# Patient Record
Sex: FEMALE | Race: WHITE | NOT HISPANIC OR LATINO
[De-identification: names, ages, dates, MRNs, and addresses within clinical notes are randomized per-mention and may not be internally consistent; named-entity substitution may affect disease eponyms.]

---

## 2018-12-09 PROBLEM — Z00.00 ENCOUNTER FOR PREVENTIVE HEALTH EXAMINATION: Status: ACTIVE | Noted: 2018-12-09

## 2018-12-21 ENCOUNTER — RECORD ABSTRACTING (OUTPATIENT)
Age: 23
End: 2018-12-21

## 2018-12-21 DIAGNOSIS — N83.209 UNSPECIFIED OVARIAN CYST, UNSPECIFIED SIDE: ICD-10-CM

## 2018-12-21 DIAGNOSIS — Z82.49 FAMILY HISTORY OF ISCHEMIC HEART DISEASE AND OTHER DISEASES OF THE CIRCULATORY SYSTEM: ICD-10-CM

## 2018-12-21 DIAGNOSIS — Z82.62 FAMILY HISTORY OF OSTEOPOROSIS: ICD-10-CM

## 2018-12-21 DIAGNOSIS — Z78.9 OTHER SPECIFIED HEALTH STATUS: ICD-10-CM

## 2018-12-21 DIAGNOSIS — Z92.89 PERSONAL HISTORY OF OTHER MEDICAL TREATMENT: ICD-10-CM

## 2019-01-07 ENCOUNTER — APPOINTMENT (OUTPATIENT)
Dept: OBGYN | Facility: CLINIC | Age: 24
End: 2019-01-07
Payer: COMMERCIAL

## 2019-01-07 ENCOUNTER — RESULT CHARGE (OUTPATIENT)
Age: 24
End: 2019-01-07

## 2019-01-07 VITALS
SYSTOLIC BLOOD PRESSURE: 98 MMHG | WEIGHT: 140 LBS | DIASTOLIC BLOOD PRESSURE: 60 MMHG | BODY MASS INDEX: 20.73 KG/M2 | HEIGHT: 69 IN

## 2019-01-07 LAB
BILIRUB UR QL STRIP: NORMAL
GLUCOSE UR-MCNC: NORMAL
HCG UR QL: 0.2 EU/DL
HCG UR QL: NEGATIVE
HGB UR QL STRIP.AUTO: NORMAL
KETONES UR-MCNC: NORMAL
LEUKOCYTE ESTERASE UR QL STRIP: NORMAL
NITRITE UR QL STRIP: NORMAL
PH UR STRIP: 5.5
PROT UR STRIP-MCNC: NORMAL
QUALITY CONTROL: YES
SP GR UR STRIP: 1.08

## 2019-01-07 PROCEDURE — 99000 SPECIMEN HANDLING OFFICE-LAB: CPT | Mod: NC

## 2019-01-07 PROCEDURE — 81003 URINALYSIS AUTO W/O SCOPE: CPT | Mod: QW

## 2019-01-07 PROCEDURE — 81025 URINE PREGNANCY TEST: CPT

## 2019-01-07 PROCEDURE — 99395 PREV VISIT EST AGE 18-39: CPT

## 2019-01-07 NOTE — PHYSICAL EXAM
[Awake] : awake [Alert] : alert [Oriented x3] : oriented to person, place, and time [Acute Distress] : no acute distress [Mass] : no breast mass [Tender] : no tenderness [Nipple Discharge] : no nipple discharge [Axillary LAD] : no axillary lymphadenopathy

## 2019-06-25 ENCOUNTER — RX RENEWAL (OUTPATIENT)
Age: 24
End: 2019-06-25

## 2019-12-02 ENCOUNTER — RX RENEWAL (OUTPATIENT)
Age: 24
End: 2019-12-02

## 2020-03-04 ENCOUNTER — RX RENEWAL (OUTPATIENT)
Age: 25
End: 2020-03-04

## 2020-05-26 ENCOUNTER — RX RENEWAL (OUTPATIENT)
Age: 25
End: 2020-05-26

## 2020-06-08 ENCOUNTER — APPOINTMENT (OUTPATIENT)
Dept: OBGYN | Facility: CLINIC | Age: 25
End: 2020-06-08
Payer: COMMERCIAL

## 2020-06-08 VITALS
SYSTOLIC BLOOD PRESSURE: 114 MMHG | HEIGHT: 69 IN | DIASTOLIC BLOOD PRESSURE: 62 MMHG | WEIGHT: 145 LBS | BODY MASS INDEX: 21.48 KG/M2

## 2020-06-08 DIAGNOSIS — Z80.41 FAMILY HISTORY OF MALIGNANT NEOPLASM OF OVARY: ICD-10-CM

## 2020-06-08 DIAGNOSIS — Z83.3 FAMILY HISTORY OF DIABETES MELLITUS: ICD-10-CM

## 2020-06-08 DIAGNOSIS — R55 SYNCOPE AND COLLAPSE: ICD-10-CM

## 2020-06-08 DIAGNOSIS — Z80.7 FAMILY HISTORY OF OTHER MALIGNANT NEOPLASMS OF LYMPHOID, HEMATOPOIETIC AND RELATED TISSUES: ICD-10-CM

## 2020-06-08 PROCEDURE — 99395 PREV VISIT EST AGE 18-39: CPT

## 2020-06-08 PROCEDURE — 99214 OFFICE O/P EST MOD 30 MIN: CPT | Mod: 25

## 2020-06-08 RX ORDER — DESOGESTREL AND ETHINYL ESTRADIOL AND ETHINYL ESTRADIOL 21-5 (28)
0.15-0.02/0.01 KIT ORAL
Qty: 3 | Refills: 0 | Status: DISCONTINUED | COMMUNITY
Start: 2020-03-04 | End: 2020-06-08

## 2020-06-10 PROBLEM — R55 VASO VAGAL EPISODE: Status: ACTIVE | Noted: 2020-06-10

## 2020-06-10 LAB
CYTOLOGY CVX/VAG DOC THIN PREP: NORMAL
HPV HIGH+LOW RISK DNA PNL CVX: NOT DETECTED

## 2020-06-10 NOTE — HISTORY OF PRESENT ILLNESS
[Good] : being in good health [Healthy Diet] : a healthy diet [Regular Exercise] : regular exercise [Reproductive Age] : is of reproductive age [Definite:  ___ (Date)] : the last menstrual period was [unfilled] [Menarche Age: ____] : age at menarche was [unfilled] [Oral Contraceptive] : uses oral contraception pills [Contraception] : uses contraception [Oral Contraceptives] : uses oral contraceptives [Weight Concerns] : no concerns with her weight [Menstrual Problems] : reports normal menses [Pregnancy History] : denies prior pregnancies [de-identified] : PELVIC U/S 3/23/2013 [Sexually Active] : is not sexually active

## 2020-06-10 NOTE — COUNSELING
[Nutrition] : nutrition [Breast Self Exam] : breast self exam [Exercise] : exercise [Vitamins/Supplements] : vitamins/supplements [Safe Sexual Practices] : safe sexual practices [Contraception] : contraception

## 2021-07-12 ENCOUNTER — RX RENEWAL (OUTPATIENT)
Age: 26
End: 2021-07-12

## 2021-07-12 RX ORDER — DESOGESTREL/ETHINYL ESTRADIOL AND ETHINYL ESTRADIOL 21-5 (28)
0.15-0.02/0.01 KIT ORAL DAILY
Qty: 3 | Refills: 0 | Status: COMPLETED | COMMUNITY
Start: 2019-01-07 | End: 2021-07-12

## 2021-08-28 ENCOUNTER — NON-APPOINTMENT (OUTPATIENT)
Age: 26
End: 2021-08-28

## 2021-08-28 ENCOUNTER — APPOINTMENT (OUTPATIENT)
Dept: OBGYN | Facility: CLINIC | Age: 26
End: 2021-08-28
Payer: COMMERCIAL

## 2021-08-28 VITALS
DIASTOLIC BLOOD PRESSURE: 78 MMHG | SYSTOLIC BLOOD PRESSURE: 128 MMHG | BODY MASS INDEX: 22.36 KG/M2 | HEIGHT: 69 IN | WEIGHT: 151 LBS

## 2021-08-28 PROCEDURE — 99395 PREV VISIT EST AGE 18-39: CPT

## 2021-08-28 RX ORDER — ESCITALOPRAM OXALATE 5 MG/1
5 TABLET, FILM COATED ORAL
Refills: 0 | Status: ACTIVE | COMMUNITY

## 2021-08-28 RX ORDER — ESCITALOPRAM OXALATE 10 MG/1
10 TABLET, FILM COATED ORAL
Refills: 0 | Status: DISCONTINUED | COMMUNITY
End: 2021-08-28

## 2021-08-28 RX ORDER — DESOGESTREL/ETHINYL ESTRADIOL AND ETHINYL ESTRADIOL 21-5 (28)
0.15-0.02/0.01 KIT ORAL
Refills: 0 | Status: DISCONTINUED | COMMUNITY
End: 2021-08-28

## 2021-08-28 NOTE — HISTORY OF PRESENT ILLNESS
[Oral Contraceptive] : uses oral contraception pills [Withdrawal] : uses withdrawal [N] : Patient denies prior pregnancies [Y] : Patient is sexually active [Menarche Age: ____] : age at menarche was [unfilled] [No] : Patient does not have concerns regarding sex [Currently Active] : currently active [Men] : men [Yes] : Yes [Condoms] : Condoms [TextBox_4] : 26-year-old G0 LMP 8/19/2021 presents for annual gynecological exam\par \par Patient is currently weaning off of Lexapro and tolerating it well and denies any ideation\par \par Patient is tolerating oral contraceptives well requesting to continue.  She has occasional vaginal dryness and will initiate coconut oil as a lubricant and call for any increasing or persisting symptoms\par \par Patient had a vasovagal reaction to her last GYN exam feels well going into today's exam we will watch her closely\par \par Patient is sexually active and using condoms and oral contraceptive\par \par Coco a  in [place] [PapSmeardate] : 6/8/20 [TextBox_31] : neg [HPVDate] : 6/8/20 [TextBox_78] : neg [LMPDate] : 8/19/21 [PGHxTotal] : 0 [FreeTextEntry1] : 8/19/21

## 2021-08-28 NOTE — COUNSELING
[Nutrition/ Exercise/ Weight Management] : nutrition, exercise, weight management [Vitamins/Supplements] : vitamins/supplements [Breast Self Exam] : breast self exam [Sunscreen] : sunscreen [Contraception/ Emergency Contraception/ Safe Sexual Practices] : contraception, emergency contraception, safe sexual practices [STD (testing, results, tx)] : STD (testing, results, tx)

## 2021-08-28 NOTE — PLAN
[FreeTextEntry1] : Instructions and precautions for oral contraceptive use reviewed with the patient\par \par Follow-up in 1 year

## 2021-08-30 LAB
C TRACH RRNA SPEC QL NAA+PROBE: NOT DETECTED
N GONORRHOEA RRNA SPEC QL NAA+PROBE: NOT DETECTED
SOURCE TP AMPLIFICATION: NORMAL

## 2021-09-11 ENCOUNTER — TRANSCRIPTION ENCOUNTER (OUTPATIENT)
Age: 26
End: 2021-09-11

## 2021-09-18 LAB — CYTOLOGY CVX/VAG DOC THIN PREP: ABNORMAL

## 2022-04-15 ENCOUNTER — APPOINTMENT (OUTPATIENT)
Dept: OBGYN | Facility: CLINIC | Age: 27
End: 2022-04-15

## 2022-04-30 ENCOUNTER — APPOINTMENT (OUTPATIENT)
Dept: OBGYN | Facility: CLINIC | Age: 27
End: 2022-04-30

## 2022-05-02 ENCOUNTER — APPOINTMENT (OUTPATIENT)
Dept: OBGYN | Facility: CLINIC | Age: 27
End: 2022-05-02
Payer: COMMERCIAL

## 2022-05-02 PROCEDURE — 99214 OFFICE O/P EST MOD 30 MIN: CPT | Mod: 95

## 2022-05-03 NOTE — HISTORY OF PRESENT ILLNESS
[FreeTextEntry1] : Patient's name and date of birth were confirmed at the beginning of the visit.\par Patient presents for this visit to discuss her pain with intercourse, vaginal dryness and hot flushes..  Patient made aware of the possibility for connection related issues and still wished to proceed with telehealth visit.\par \par \par Patient made aware  of privacy practices patient made aware that telehealth visits may incur similar charges to an in person visit.  Patient made aware that she may still need an in person visit at the end of the telehealth appointment\par \par Patient presents stating that she has vaginal dryness that she feels may be contributing to her pain with intercourse.  Patient states she stopped her Lexapro which she was taking for anxiety to see if the vaginal dryness improved but her symptoms of pain with intercourse persist.  Patient continues to take oral contraceptives.\par \par Patient stating she feels as if she gets a rug burn type sensation in the vagina every time she is sexually active.  She is also complaining of hot flashes the week of her menses.\par \par Patient lives in [place] currently.\par \par I did review the benefits of a speculum exam with any vaginal complaints to rule out any chance for a infection and patient verbalized good understanding.  \par \par Patient is considering stopping oral contraceptive and safe sex practices alternative options/including barrier methods were reviewed.\par \par I also reviewed the causes of hot flashes and vaginal dryness and patient is interested in having her hormone levels checked we discussed her\par \par Pain with intercourse symptoms may be related to a pelvic floor dysfunction and again I advised that she should have a pelvic exam and evaluation.  Patient has questions about pelvic floor dysfunction and we reviewed the benefits of physical therapy as well as the options of medications if found on exam.\par \par Patient stating she wishes to come in for a pelvic exam and hormone work-up in 1 month.  Safe sex practices reviewed.  Warning signs for more immediate attention outlined\par \par Patient states she is feeling well off the Lexapro and denies any increased anxiety symptoms.  I advise she follow-up closely with her primary care physician as well as consider psychotherapy as the benefits were reviewed.  Patient verbalizes good understanding\par \par \par \par

## 2022-07-02 DIAGNOSIS — N89.8 OTHER SPECIFIED NONINFLAMMATORY DISORDERS OF VAGINA: ICD-10-CM

## 2022-07-23 ENCOUNTER — TRANSCRIPTION ENCOUNTER (OUTPATIENT)
Age: 27
End: 2022-07-23

## 2022-08-01 ENCOUNTER — APPOINTMENT (OUTPATIENT)
Dept: OBGYN | Facility: CLINIC | Age: 27
End: 2022-08-01

## 2022-08-01 DIAGNOSIS — Z30.09 ENCOUNTER FOR OTHER GENERAL COUNSELING AND ADVICE ON CONTRACEPTION: ICD-10-CM

## 2022-08-01 DIAGNOSIS — N89.8 OTHER SPECIFIED NONINFLAMMATORY DISORDERS OF VAGINA: ICD-10-CM

## 2022-08-01 DIAGNOSIS — R23.2 FLUSHING: ICD-10-CM

## 2022-08-01 DIAGNOSIS — N94.10 UNSPECIFIED DYSPAREUNIA: ICD-10-CM

## 2022-08-01 PROCEDURE — 99213 OFFICE O/P EST LOW 20 MIN: CPT | Mod: 95

## 2022-08-01 NOTE — HISTORY OF PRESENT ILLNESS
[FreeTextEntry1] : Patient presents for telehealth visit and she was counseled that she may still require an in person office visit.  She was advised that the charges are similar to an in person office visit.  She was advised of the possible connection issues that may require her to still have an in person visit.  Her date of birth and last name was confirmed.  Privacy practices for telehealth visits was reviewed with the patient and [employer] for telehealth visits was reviewed and the patient wished to proceed\par \par \par Patient presents for telehealth consultation to follow-up on her complaints of vaginal dryness and she came off of Lexapro and birth control and her vaginal dryness has completely gone away.  She is working closely with a therapist and they are considering whether or not they need to start medication.\par \par She is interested in restarting the birth control but a different 1 and she will monitor her moods closely.\par \par We reviewed her normal lab work within normal [facility].  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  I advised we do a recheck at the time of her annual in 3 months and patient verbalizes good understanding

## 2022-08-01 NOTE — PLAN
[FreeTextEntry1] : Follow-up in 3 months for annual exam and recheck on new birth control or sooner as needed.\par \par The importance of daily exercise calcium vitamin D was reviewed.\par \par Continued follow-up and surveillance with therapist and psychiatric support encouraged \par \par patient appreciative verbalizes good understanding

## 2022-11-12 ENCOUNTER — NON-APPOINTMENT (OUTPATIENT)
Age: 27
End: 2022-11-12

## 2022-11-12 ENCOUNTER — APPOINTMENT (OUTPATIENT)
Dept: OBGYN | Facility: CLINIC | Age: 27
End: 2022-11-12

## 2022-11-12 VITALS
DIASTOLIC BLOOD PRESSURE: 66 MMHG | HEIGHT: 69 IN | SYSTOLIC BLOOD PRESSURE: 122 MMHG | WEIGHT: 152 LBS | BODY MASS INDEX: 22.51 KG/M2

## 2022-11-12 DIAGNOSIS — Z30.41 ENCOUNTER FOR SURVEILLANCE OF CONTRACEPTIVE PILLS: ICD-10-CM

## 2022-11-12 PROCEDURE — 99395 PREV VISIT EST AGE 18-39: CPT

## 2022-11-12 RX ORDER — DESOGESTREL/ETHINYL ESTRADIOL AND ETHINYL ESTRADIOL 21-5 (28)
0.15-0.02/0.01 KIT ORAL DAILY
Qty: 3 | Refills: 3 | Status: DISCONTINUED | COMMUNITY
Start: 2020-06-10 | End: 2022-11-12

## 2022-11-12 NOTE — PLAN
Airway patent, Nasal mucosa clear. Mouth with normal mucosa. Throat has no vesicles, no oropharyngeal exudates and uvula is midline.
[FreeTextEntry1] : Instructions precautions for oral contraceptive use reviewed.\par \par Follow-up in 1 year or sooner as needed

## 2022-11-12 NOTE — HISTORY OF PRESENT ILLNESS
[Oral Contraceptive] : uses oral contraception pills [Menarche Age: ____] : age at menarche was [unfilled] [N] : Patient reports normal menses [Y] : Patient is sexually active [Currently Active] : currently active [PapSmeardate] : 8/28/21 [TextBox_31] : NEG [GonorrheaDate] : 8/28/21 [TextBox_63] : NEG [ChlamydiaDate] : 8/28/21 [TextBox_68] : NEG [LMPDate] : 10/30/22 [PGHxTotal] : 0 [FreeTextEntry1] : 10/30/22

## 2023-01-21 LAB — CYTOLOGY CVX/VAG DOC THIN PREP: NORMAL

## 2023-08-12 ENCOUNTER — RX RENEWAL (OUTPATIENT)
Age: 28
End: 2023-08-12

## 2023-10-29 RX ORDER — NORGESTIMATE AND ETHINYL ESTRADIOL 0.25-0.035
0.25-35 KIT ORAL DAILY
Qty: 1 | Refills: 0 | Status: ACTIVE | COMMUNITY
Start: 2022-08-01

## 2023-11-18 ENCOUNTER — NON-APPOINTMENT (OUTPATIENT)
Age: 28
End: 2023-11-18

## 2023-11-18 ENCOUNTER — APPOINTMENT (OUTPATIENT)
Dept: OBGYN | Facility: CLINIC | Age: 28
End: 2023-11-18
Payer: COMMERCIAL

## 2023-11-18 VITALS
HEIGHT: 69 IN | WEIGHT: 155 LBS | BODY MASS INDEX: 22.96 KG/M2 | DIASTOLIC BLOOD PRESSURE: 60 MMHG | SYSTOLIC BLOOD PRESSURE: 112 MMHG

## 2023-11-18 DIAGNOSIS — Z01.419 ENCOUNTER FOR GYNECOLOGICAL EXAMINATION (GENERAL) (ROUTINE) W/OUT ABNORMAL FINDINGS: ICD-10-CM

## 2023-11-18 DIAGNOSIS — N94.3 PREMENSTRUAL TENSION SYNDROME: ICD-10-CM

## 2023-11-18 DIAGNOSIS — Z12.4 ENCOUNTER FOR SCREENING FOR MALIGNANT NEOPLASM OF CERVIX: ICD-10-CM

## 2023-11-18 DIAGNOSIS — Z30.09 ENCOUNTER FOR OTHER GENERAL COUNSELING AND ADVICE ON CONTRACEPTION: ICD-10-CM

## 2023-11-18 PROCEDURE — 99212 OFFICE O/P EST SF 10 MIN: CPT | Mod: 25

## 2023-11-18 PROCEDURE — 81025 URINE PREGNANCY TEST: CPT

## 2023-11-18 PROCEDURE — 99395 PREV VISIT EST AGE 18-39: CPT | Mod: 25

## 2023-11-18 RX ORDER — ESCITALOPRAM OXALATE 10 MG/1
10 TABLET ORAL DAILY
Qty: 90 | Refills: 1 | Status: ACTIVE | COMMUNITY
Start: 2023-11-18 | End: 1900-01-01

## 2023-11-18 RX ORDER — NORGESTIMATE AND ETHINYL ESTRADIOL 0.25-0.035
0.25-35 KIT ORAL
Qty: 3 | Refills: 3 | Status: ACTIVE | COMMUNITY
Start: 2023-08-12 | End: 1900-01-01

## 2023-12-16 LAB
C TRACH RRNA SPEC QL NAA+PROBE: NOT DETECTED
CYTOLOGY CVX/VAG DOC THIN PREP: NORMAL
HCG UR QL: NEGATIVE
N GONORRHOEA RRNA SPEC QL NAA+PROBE: NOT DETECTED
QUALITY CONTROL: YES
SOURCE TP AMPLIFICATION: NORMAL

## 2023-12-18 NOTE — PLAN
[FreeTextEntry1] : therapist fu encoursged yoga meditation fu 1 yr annual fu 6 mos Lexapro or sooner prn- denies ideation

## 2023-12-18 NOTE — HISTORY OF PRESENT ILLNESS
[PapSmeardate] : 11/12/22 [TextBox_31] : NEG [GonorrheaDate] : 11/12/22 [TextBox_63] : NEG [ChlamydiaDate] : 11/12/22 [TextBox_68] : NEG [HPVDate] : 06/18/20 [TextBox_78] : NEG [LMPDate] : 11/1/23 [PGHxTotal] : 0 [FreeTextEntry1] : 11/1/23

## 2024-10-30 ENCOUNTER — TRANSCRIPTION ENCOUNTER (OUTPATIENT)
Age: 29
End: 2024-10-30

## 2025-01-18 ENCOUNTER — APPOINTMENT (OUTPATIENT)
Dept: OBGYN | Facility: CLINIC | Age: 30
End: 2025-01-18
Payer: COMMERCIAL

## 2025-01-18 ENCOUNTER — NON-APPOINTMENT (OUTPATIENT)
Age: 30
End: 2025-01-18

## 2025-01-18 VITALS
WEIGHT: 160 LBS | DIASTOLIC BLOOD PRESSURE: 60 MMHG | SYSTOLIC BLOOD PRESSURE: 120 MMHG | HEIGHT: 69 IN | BODY MASS INDEX: 23.7 KG/M2

## 2025-01-18 DIAGNOSIS — Z01.419 ENCOUNTER FOR GYNECOLOGICAL EXAMINATION (GENERAL) (ROUTINE) W/OUT ABNORMAL FINDINGS: ICD-10-CM

## 2025-01-18 DIAGNOSIS — Z80.41 FAMILY HISTORY OF MALIGNANT NEOPLASM OF OVARY: ICD-10-CM

## 2025-01-18 DIAGNOSIS — Z30.09 ENCOUNTER FOR OTHER GENERAL COUNSELING AND ADVICE ON CONTRACEPTION: ICD-10-CM

## 2025-01-18 DIAGNOSIS — N94.3 PREMENSTRUAL TENSION SYNDROME: ICD-10-CM

## 2025-01-18 DIAGNOSIS — Z11.3 ENCOUNTER FOR SCREENING FOR INFECTIONS WITH A PREDOMINANTLY SEXUAL MODE OF TRANSMISSION: ICD-10-CM

## 2025-01-18 PROCEDURE — 99459 PELVIC EXAMINATION: CPT

## 2025-01-18 PROCEDURE — 81025 URINE PREGNANCY TEST: CPT

## 2025-01-18 PROCEDURE — 99395 PREV VISIT EST AGE 18-39: CPT

## 2025-01-22 LAB
C TRACH RRNA SPEC QL NAA+PROBE: NOT DETECTED
HCG UR QL: NEGATIVE
N GONORRHOEA RRNA SPEC QL NAA+PROBE: NOT DETECTED
QUALITY CONTROL: YES
SOURCE AMPLIFICATION: NORMAL